# Patient Record
Sex: FEMALE | Race: WHITE | NOT HISPANIC OR LATINO | URBAN - METROPOLITAN AREA
[De-identification: names, ages, dates, MRNs, and addresses within clinical notes are randomized per-mention and may not be internally consistent; named-entity substitution may affect disease eponyms.]

---

## 2018-01-29 ENCOUNTER — EMERGENCY (EMERGENCY)
Facility: HOSPITAL | Age: 68
LOS: 0 days | Discharge: HOME | End: 2018-01-29

## 2018-01-29 DIAGNOSIS — K43.9 VENTRAL HERNIA WITHOUT OBSTRUCTION OR GANGRENE: ICD-10-CM

## 2018-01-29 DIAGNOSIS — I10 ESSENTIAL (PRIMARY) HYPERTENSION: ICD-10-CM

## 2018-01-29 DIAGNOSIS — R10.9 UNSPECIFIED ABDOMINAL PAIN: ICD-10-CM

## 2018-02-12 ENCOUNTER — OUTPATIENT (OUTPATIENT)
Dept: OUTPATIENT SERVICES | Facility: HOSPITAL | Age: 68
LOS: 1 days | Discharge: HOME | End: 2018-02-12

## 2018-02-12 VITALS
WEIGHT: 194.01 LBS | HEART RATE: 80 BPM | HEIGHT: 62 IN | RESPIRATION RATE: 18 BRPM | TEMPERATURE: 97 F | SYSTOLIC BLOOD PRESSURE: 14 MMHG | OXYGEN SATURATION: 97 %

## 2018-02-12 DIAGNOSIS — Z01.818 ENCOUNTER FOR OTHER PREPROCEDURAL EXAMINATION: ICD-10-CM

## 2018-02-12 DIAGNOSIS — Z98.890 OTHER SPECIFIED POSTPROCEDURAL STATES: Chronic | ICD-10-CM

## 2018-02-12 LAB
ANION GAP SERPL CALC-SCNC: 9 MMOL/L — SIGNIFICANT CHANGE UP (ref 7–14)
APPEARANCE UR: (no result)
BACTERIA # UR AUTO: (no result) /HPF
BASOPHILS # BLD AUTO: 0.02 K/UL — SIGNIFICANT CHANGE UP (ref 0–0.2)
BASOPHILS NFR BLD AUTO: 0.3 % — SIGNIFICANT CHANGE UP (ref 0–1)
BILIRUB UR-MCNC: NEGATIVE — SIGNIFICANT CHANGE UP
BUN SERPL-MCNC: 10 MG/DL — SIGNIFICANT CHANGE UP (ref 10–20)
CALCIUM SERPL-MCNC: 9.1 MG/DL — SIGNIFICANT CHANGE UP (ref 8.5–10.1)
CHLORIDE SERPL-SCNC: 101 MMOL/L — SIGNIFICANT CHANGE UP (ref 98–110)
CO2 SERPL-SCNC: 27 MMOL/L — SIGNIFICANT CHANGE UP (ref 17–32)
COLOR SPEC: YELLOW — SIGNIFICANT CHANGE UP
CREAT SERPL-MCNC: 0.6 MG/DL — LOW (ref 0.7–1.5)
DIFF PNL FLD: NEGATIVE — SIGNIFICANT CHANGE UP
EOSINOPHIL # BLD AUTO: 0.29 K/UL — SIGNIFICANT CHANGE UP (ref 0–0.7)
EOSINOPHIL NFR BLD AUTO: 4.3 % — SIGNIFICANT CHANGE UP (ref 0–8)
GLUCOSE SERPL-MCNC: 93 MG/DL — SIGNIFICANT CHANGE UP (ref 70–110)
GLUCOSE UR QL: NEGATIVE MG/DL — SIGNIFICANT CHANGE UP
HCT VFR BLD CALC: 40.7 % — SIGNIFICANT CHANGE UP (ref 37–47)
HGB BLD-MCNC: 13.3 G/DL — LOW (ref 14–18)
IMM GRANULOCYTES NFR BLD AUTO: 0.3 % — SIGNIFICANT CHANGE UP (ref 0.1–0.3)
KETONES UR-MCNC: NEGATIVE — SIGNIFICANT CHANGE UP
LEUKOCYTE ESTERASE UR-ACNC: (no result)
LYMPHOCYTES # BLD AUTO: 2.46 K/UL — SIGNIFICANT CHANGE UP (ref 1.2–3.4)
LYMPHOCYTES # BLD AUTO: 36.6 % — SIGNIFICANT CHANGE UP (ref 20.5–51.1)
MCHC RBC-ENTMCNC: 30 PG — SIGNIFICANT CHANGE UP (ref 27–31)
MCHC RBC-ENTMCNC: 32.7 G/DL — SIGNIFICANT CHANGE UP (ref 32–37)
MCV RBC AUTO: 91.7 FL — HIGH (ref 81–91)
MONOCYTES # BLD AUTO: 0.56 K/UL — SIGNIFICANT CHANGE UP (ref 0.1–0.6)
MONOCYTES NFR BLD AUTO: 8.3 % — SIGNIFICANT CHANGE UP (ref 1.7–9.3)
NEUTROPHILS # BLD AUTO: 3.38 K/UL — SIGNIFICANT CHANGE UP (ref 1.4–6.5)
NEUTROPHILS NFR BLD AUTO: 50.2 % — SIGNIFICANT CHANGE UP (ref 42.2–75.2)
NITRITE UR-MCNC: NEGATIVE — SIGNIFICANT CHANGE UP
NRBC # BLD: 0 /100 WBCS — SIGNIFICANT CHANGE UP (ref 0–0)
PH UR: 7 — SIGNIFICANT CHANGE UP (ref 5–8)
PLATELET # BLD AUTO: 238 K/UL — SIGNIFICANT CHANGE UP (ref 130–400)
POTASSIUM SERPL-MCNC: 4.1 MMOL/L — SIGNIFICANT CHANGE UP (ref 3.5–5)
POTASSIUM SERPL-SCNC: 4.1 MMOL/L — SIGNIFICANT CHANGE UP (ref 3.5–5)
PROT UR-MCNC: NEGATIVE MG/DL — SIGNIFICANT CHANGE UP
RBC # BLD: 4.44 M/UL — SIGNIFICANT CHANGE UP (ref 4.2–5.4)
RBC # FLD: 12.3 % — SIGNIFICANT CHANGE UP (ref 11.5–14.5)
RBC CASTS # UR COMP ASSIST: SIGNIFICANT CHANGE UP /HPF
SODIUM SERPL-SCNC: 137 MMOL/L — SIGNIFICANT CHANGE UP (ref 135–146)
SP GR SPEC: 1.02 — SIGNIFICANT CHANGE UP (ref 1.01–1.03)
UROBILINOGEN FLD QL: 1 MG/DL (ref 0.2–0.2)
WBC # BLD: 6.73 K/UL — SIGNIFICANT CHANGE UP (ref 4.8–10.8)
WBC # FLD AUTO: 6.73 K/UL — SIGNIFICANT CHANGE UP (ref 4.8–10.8)
WBC UR QL: SIGNIFICANT CHANGE UP /HPF

## 2018-02-12 NOTE — H&P PST ADULT - NSANTHOSAYNRD_GEN_A_CORE
No. STACIE screening performed.  STOP BANG Legend: 0-2 = LOW Risk; 3-4 = INTERMEDIATE Risk; 5-8 = HIGH Risk/SEE SCREEN

## 2018-02-12 NOTE — H&P PST ADULT - HISTORY OF PRESENT ILLNESS
PT CURRENTLY DENIES CHEST PAIN, PALPITATIONS, DYSURIA RECENT ILLNESS  EXERCISE TOLERANCE 1 BLOCK  PT DENIES ANY RASHES, ABRASION, OR OPEN WOUNDS OR CUTS    AS PER THE PT AND HER DAUGHTER, THIS IS A COMPLETE MEDICAL AND SURGICAL HX, INCLUDING MEDICATIONS PRESCRIBED AND OVER THE COUNTER MEDICATIONS

## 2018-02-15 ENCOUNTER — OUTPATIENT (OUTPATIENT)
Dept: OUTPATIENT SERVICES | Facility: HOSPITAL | Age: 68
LOS: 1 days | Discharge: HOME | End: 2018-02-15

## 2018-02-15 ENCOUNTER — RESULT REVIEW (OUTPATIENT)
Age: 68
End: 2018-02-15

## 2018-02-15 VITALS — RESPIRATION RATE: 18 BRPM | DIASTOLIC BLOOD PRESSURE: 72 MMHG | HEART RATE: 62 BPM | SYSTOLIC BLOOD PRESSURE: 143 MMHG

## 2018-02-15 VITALS
OXYGEN SATURATION: 96 % | DIASTOLIC BLOOD PRESSURE: 63 MMHG | WEIGHT: 190.04 LBS | TEMPERATURE: 97 F | HEIGHT: 62 IN | RESPIRATION RATE: 18 BRPM | HEART RATE: 64 BPM | SYSTOLIC BLOOD PRESSURE: 122 MMHG

## 2018-02-15 DIAGNOSIS — Z98.890 OTHER SPECIFIED POSTPROCEDURAL STATES: Chronic | ICD-10-CM

## 2018-02-15 RX ORDER — ONDANSETRON 8 MG/1
4 TABLET, FILM COATED ORAL ONCE
Qty: 0 | Refills: 0 | Status: COMPLETED | OUTPATIENT
Start: 2018-02-15 | End: 2018-02-15

## 2018-02-15 RX ORDER — CEPHALEXIN 500 MG
1 CAPSULE ORAL
Qty: 10 | Refills: 0
Start: 2018-02-15

## 2018-02-15 RX ORDER — KETOROLAC TROMETHAMINE 30 MG/ML
30 SYRINGE (ML) INJECTION ONCE
Qty: 0 | Refills: 0 | Status: DISCONTINUED | OUTPATIENT
Start: 2018-02-15 | End: 2018-02-15

## 2018-02-15 RX ORDER — OXYCODONE AND ACETAMINOPHEN 5; 325 MG/1; MG/1
1 TABLET ORAL ONCE
Qty: 0 | Refills: 0 | Status: DISCONTINUED | OUTPATIENT
Start: 2018-02-15 | End: 2018-02-15

## 2018-02-15 RX ORDER — SODIUM CHLORIDE 9 MG/ML
1000 INJECTION, SOLUTION INTRAVENOUS
Qty: 0 | Refills: 0 | Status: DISCONTINUED | OUTPATIENT
Start: 2018-02-15 | End: 2018-03-02

## 2018-02-15 RX ORDER — HYDROMORPHONE HYDROCHLORIDE 2 MG/ML
1 INJECTION INTRAMUSCULAR; INTRAVENOUS; SUBCUTANEOUS ONCE
Qty: 0 | Refills: 0 | Status: DISCONTINUED | OUTPATIENT
Start: 2018-02-15 | End: 2018-02-15

## 2018-02-15 RX ADMIN — ONDANSETRON 4 MILLIGRAM(S): 8 TABLET, FILM COATED ORAL at 12:12

## 2018-02-15 RX ADMIN — SODIUM CHLORIDE 100 MILLILITER(S): 9 INJECTION, SOLUTION INTRAVENOUS at 11:28

## 2018-02-15 NOTE — ASU DISCHARGE PLAN (ADULT/PEDIATRIC). - SPECIAL INSTRUCTIONS
percocet for pain q 4 prn, keflex 2 times daily for 5 day, Keep ambar drain in empty as needed, leave dressing on till office visit

## 2018-02-15 NOTE — ASU DISCHARGE PLAN (ADULT/PEDIATRIC). - NOTIFY
Numbness, tingling/Unable to Urinate/Pain not relieved by Medications/Bleeding that does not stop/Numbness, color, or temperature change to extremity/Swelling that continues/Fever greater than 101/Excessive Diarrhea/Persistent Nausea and Vomiting

## 2018-02-15 NOTE — PRE-ANESTHESIA EVALUATION ADULT - NSANTHOSAYNRD_GEN_A_CORE
SEE SCREEN/No. STACIE screening performed.  STOP BANG Legend: 0-2 = LOW Risk; 3-4 = INTERMEDIATE Risk; 5-8 = HIGH Risk

## 2018-02-15 NOTE — ASU PREOP CHECKLIST - TO WHOM
sunrise down at time of admission- chart done on paper- handoff given to brie garcia by brie mcgowan

## 2018-02-16 LAB — SURGICAL PATHOLOGY STUDY: SIGNIFICANT CHANGE UP

## 2018-02-27 DIAGNOSIS — K43.0 INCISIONAL HERNIA WITH OBSTRUCTION, WITHOUT GANGRENE: ICD-10-CM

## 2019-08-30 ENCOUNTER — EMERGENCY (EMERGENCY)
Facility: HOSPITAL | Age: 69
LOS: 0 days | Discharge: HOME | End: 2019-08-30
Attending: EMERGENCY MEDICINE | Admitting: EMERGENCY MEDICINE
Payer: MEDICARE

## 2019-08-30 VITALS
SYSTOLIC BLOOD PRESSURE: 124 MMHG | OXYGEN SATURATION: 96 % | DIASTOLIC BLOOD PRESSURE: 70 MMHG | TEMPERATURE: 98 F | HEART RATE: 64 BPM | RESPIRATION RATE: 17 BRPM

## 2019-08-30 VITALS
TEMPERATURE: 98 F | WEIGHT: 205.03 LBS | DIASTOLIC BLOOD PRESSURE: 74 MMHG | HEART RATE: 64 BPM | RESPIRATION RATE: 18 BRPM | HEIGHT: 61.02 IN | SYSTOLIC BLOOD PRESSURE: 120 MMHG

## 2019-08-30 DIAGNOSIS — I10 ESSENTIAL (PRIMARY) HYPERTENSION: ICD-10-CM

## 2019-08-30 DIAGNOSIS — M25.471 EFFUSION, RIGHT ANKLE: ICD-10-CM

## 2019-08-30 DIAGNOSIS — I80.01 PHLEBITIS AND THROMBOPHLEBITIS OF SUPERFICIAL VESSELS OF RIGHT LOWER EXTREMITY: ICD-10-CM

## 2019-08-30 DIAGNOSIS — Z98.890 OTHER SPECIFIED POSTPROCEDURAL STATES: Chronic | ICD-10-CM

## 2019-08-30 PROBLEM — F32.9 MAJOR DEPRESSIVE DISORDER, SINGLE EPISODE, UNSPECIFIED: Chronic | Status: ACTIVE | Noted: 2018-02-12

## 2019-08-30 PROBLEM — E66.9 OBESITY, UNSPECIFIED: Chronic | Status: ACTIVE | Noted: 2018-02-12

## 2019-08-30 PROBLEM — F41.9 ANXIETY DISORDER, UNSPECIFIED: Chronic | Status: ACTIVE | Noted: 2018-02-12

## 2019-08-30 PROBLEM — K44.9 DIAPHRAGMATIC HERNIA WITHOUT OBSTRUCTION OR GANGRENE: Chronic | Status: ACTIVE | Noted: 2018-02-12

## 2019-08-30 PROCEDURE — 93970 EXTREMITY STUDY: CPT | Mod: 26

## 2019-08-30 PROCEDURE — 99284 EMERGENCY DEPT VISIT MOD MDM: CPT

## 2019-08-30 RX ORDER — ASPIRIN/CALCIUM CARB/MAGNESIUM 324 MG
0 TABLET ORAL
Qty: 0 | Refills: 0 | DISCHARGE

## 2019-08-30 RX ORDER — LITHIUM CARBONATE 300 MG/1
300 TABLET, EXTENDED RELEASE ORAL
Qty: 0 | Refills: 0 | DISCHARGE

## 2019-08-30 RX ORDER — RISPERIDONE 4 MG/1
0 TABLET ORAL
Qty: 0 | Refills: 0 | DISCHARGE

## 2019-08-30 RX ORDER — SIMVASTATIN 20 MG/1
1 TABLET, FILM COATED ORAL
Qty: 0 | Refills: 0 | DISCHARGE

## 2019-08-30 RX ORDER — LISINOPRIL 2.5 MG/1
1 TABLET ORAL
Qty: 0 | Refills: 0 | DISCHARGE

## 2019-08-30 RX ORDER — ATENOLOL 25 MG/1
1 TABLET ORAL
Qty: 0 | Refills: 0 | DISCHARGE

## 2019-08-30 NOTE — ED PROVIDER NOTE - MUSCULOSKELETAL MINIMAL EXAM
mild tenderness over right calf and inner thigh, Mild bilateral ankle swelling, toes pink , warm with normal cap refill. DP normal

## 2019-08-30 NOTE — ED ADULT TRIAGE NOTE - CHIEF COMPLAINT QUOTE
c/oswelling of ankles x 1 yr. Pt has been seeing a podiatrist and is suppose to have a sono next week

## 2019-08-30 NOTE — ED PROVIDER NOTE - PROVIDER TOKENS
PROVIDER:[TOKEN:[[id number]]] PROVIDER:[TOKEN:[[id number]]],PROVIDER:[TOKEN:[[id number]]]

## 2019-08-30 NOTE — ED PROVIDER NOTE - OBJECTIVE STATEMENT
Patient c/o ankle swelling for years, Seen by PMD and sent to podiatry, Patient noted right leg tenderness over vein past few days. no fever, no chest pain, no SOB.

## 2019-08-30 NOTE — ED PROVIDER NOTE - CARE PROVIDER_API CALL
Artur Lee (MD)  Medicine  [address] [place] [address]  Phone: [phone]  Fax: [phone]  Follow Up Time: Artur Lee)  Medicine  [address] [place] [address]  Phone: [phone]  Fax: [phone]  Follow Up Time:     Dima Guzman)  Vascular Surgery  [address] [place] [address]  Phone: [phone]  Fax: [phone]  Follow Up Time:

## 2019-08-30 NOTE — ED PROVIDER NOTE - INTERPRETATION SERVICES DECLINED
granddaughter acts as interpretor granddaughter acts as /Patient/Caregiver requests family/friend to interpret.

## 2019-08-30 NOTE — ED PROVIDER NOTE - CLINICAL SUMMARY MEDICAL DECISION MAKING FREE TEXT BOX
69 female here for leg pain, had screening imaging identifying superficial thrombophlebitis above the knee. Case briefly d/w Vascular Dr. Lou to align outpatient care, recommendations made, explained to patient and family, will discharge with return and follow up instructions.

## 2019-08-30 NOTE — ED PROVIDER NOTE - NSFOLLOWUPINSTRUCTIONS_ED_ALL_ED_FT
Phlebitis    Phlebitis is soreness and swelling (inflammation) of a vein. This can occur in your arms, legs, or torso (trunk), as well as deeper inside your body. Phlebitis is usually not serious when it occurs close to the surface of the body. However, it can cause serious problems when it occurs in a vein deeper inside the body.    CAUSES  Phlebitis can be triggered by various things, including:     Reduced blood flow through your veins. This can happen with:  Bed rest over a long period.  Long-distance travel.  Injury.   Surgery.   Being overweight (obese) or pregnant.  Having an IV tube put in the vein and getting certain medicines through the vein.  Cancer and cancer treatment.  Use of illegal drugs taken through the vein.  Inflammatory diseases.  Inherited (genetic) diseases that increase the risk of blood clots.  Hormone therapy, such as birth control pills.    SIGNS AND SYMPTOMS  Red, tender, swollen, and painful area on your skin. Usually, the area will be long and narrow.  Firmness along the center of the affected area. This can indicate that a blood clot has formed.   Low-grade fever.     DIAGNOSIS  A health care provider can usually diagnose phlebitis by examining the affected area and asking about your symptoms. To check for infection or blood clots, your health care provider may order blood tests or an ultrasound exam of the area. Blood tests and your family history may also indicate if you have an underlying genetic disease that causes blood clots. Occasionally, a piece of tissue is taken from the body (biopsy sample) if an unusual cause of phlebitis is suspected.    TREATMENT  Treatment will vary depending on the severity of the condition and the area of the body affected. Treatment may include:    Use of a warm compress or heating pad.  Use of compression stockings or bandages.  Anti-inflammatory medicines.   Removal of any IV tube that may be causing the problem.  Medicines that kill germs (antibiotics) if an infection is present.  Blood-thinning medicines if a blood clot is suspected or present.  In rare cases, surgery may be needed to remove damaged sections of vein.     HOME CARE INSTRUCTIONS  Only take over-the-counter or prescription medicines as directed by your health care provider. Take all medicines exactly as prescribed.   Raise (elevate) the affected area above the level of your heart as directed by your health care provider.  Apply a warm compress or heating pad to the affected area as directed by your health care provider. Do not sleep with the heating pad.  Use compression stockings or bandages as directed. These will speed healing and prevent the condition from coming back.   If you are on blood thinners:  Get follow-up blood tests as directed by your health care provider.  Check with your health care provider before using any new medicines.   Carry a medical alert card or wear your medical alert jewelry to show that you are on blood thinners.   For phlebitis in the legs:  Avoid prolonged standing or bed rest.  Keep your legs moving. Raise your legs when sitting or lying.   Do not smoke.  Women, particularly those over the age of 35, should consider the risks and benefits of taking the contraceptive pill. This kind of hormone treatment can increase your risk for blood clots.   Follow up with your health care provider as directed.     SEEK MEDICAL CARE IF:  You have unusual bruising or any bleeding problems.  Your swelling or pain in the affected area is not improving.  You are on anti-inflammatory medicine, and you develop belly (abdominal) pain.     SEEK IMMEDIATE MEDICAL CARE IF:  You have a sudden onset of chest pain or difficulty breathing.   You have a fever or persistent symptoms for more than 2–3 days.   You have a fever and your symptoms suddenly get worse.     MAKE SURE YOU:  Understand these instructions.  Will watch your condition.  Will get help right away if you are not doing well or get worse.    ADDITIONAL NOTES AND INSTRUCTIONS    Please follow up with your Primary MD in 24-48 hr.  Seek immediate medical care for any new/worsening signs or symptoms.     Document Released: 12/12/2002 Document Revised: 4/10/2017 Document Reviewed: 8/25/2014  [employer] Interactive Patient Education ©2016 [employer] This information is not intended to replace advice given to you by your health care provider. Make sure you discuss any questions you have with your health care provider. Take aspirin 325 mg daily,   see vascular surgery 2 weeks.       Phlebitis    Phlebitis is soreness and swelling (inflammation) of a vein. This can occur in your arms, legs, or torso (trunk), as well as deeper inside your body. Phlebitis is usually not serious when it occurs close to the surface of the body. However, it can cause serious problems when it occurs in a vein deeper inside the body.    CAUSES  Phlebitis can be triggered by various things, including:     Reduced blood flow through your veins. This can happen with:  Bed rest over a long period.  Long-distance travel.  Injury.   Surgery.   Being overweight (obese) or pregnant.  Having an IV tube put in the vein and getting certain medicines through the vein.  Cancer and cancer treatment.  Use of illegal drugs taken through the vein.  Inflammatory diseases.  Inherited (genetic) diseases that increase the risk of blood clots.  Hormone therapy, such as birth control pills.    SIGNS AND SYMPTOMS  Red, tender, swollen, and painful area on your skin. Usually, the area will be long and narrow.  Firmness along the center of the affected area. This can indicate that a blood clot has formed.   Low-grade fever.     DIAGNOSIS  A health care provider can usually diagnose phlebitis by examining the affected area and asking about your symptoms. To check for infection or blood clots, your health care provider may order blood tests or an ultrasound exam of the area. Blood tests and your family history may also indicate if you have an underlying genetic disease that causes blood clots. Occasionally, a piece of tissue is taken from the body (biopsy sample) if an unusual cause of phlebitis is suspected.    TREATMENT  Treatment will vary depending on the severity of the condition and the area of the body affected. Treatment may include:    Use of a warm compress or heating pad.  Use of compression stockings or bandages.  Anti-inflammatory medicines.   Removal of any IV tube that may be causing the problem.  Medicines that kill germs (antibiotics) if an infection is present.  Blood-thinning medicines if a blood clot is suspected or present.  In rare cases, surgery may be needed to remove damaged sections of vein.     HOME CARE INSTRUCTIONS  Only take over-the-counter or prescription medicines as directed by your health care provider. Take all medicines exactly as prescribed.   Raise (elevate) the affected area above the level of your heart as directed by your health care provider.  Apply a warm compress or heating pad to the affected area as directed by your health care provider. Do not sleep with the heating pad.  Use compression stockings or bandages as directed. These will speed healing and prevent the condition from coming back.   If you are on blood thinners:  Get follow-up blood tests as directed by your health care provider.  Check with your health care provider before using any new medicines.   Carry a medical alert card or wear your medical alert jewelry to show that you are on blood thinners.   For phlebitis in the legs:  Avoid prolonged standing or bed rest.  Keep your legs moving. Raise your legs when sitting or lying.   Do not smoke.  Women, particularly those over the age of 35, should consider the risks and benefits of taking the contraceptive pill. This kind of hormone treatment can increase your risk for blood clots.   Follow up with your health care provider as directed.     SEEK MEDICAL CARE IF:  You have unusual bruising or any bleeding problems.  Your swelling or pain in the affected area is not improving.  You are on anti-inflammatory medicine, and you develop belly (abdominal) pain.     SEEK IMMEDIATE MEDICAL CARE IF:  You have a sudden onset of chest pain or difficulty breathing.   You have a fever or persistent symptoms for more than 2–3 days.   You have a fever and your symptoms suddenly get worse.     MAKE SURE YOU:  Understand these instructions.  Will watch your condition.  Will get help right away if you are not doing well or get worse.    ADDITIONAL NOTES AND INSTRUCTIONS    Please follow up with your Primary MD in 24-48 hr.  Seek immediate medical care for any new/worsening signs or symptoms.     Document Released: 12/12/2002 Document Revised: 4/10/2017 Document Reviewed: 8/25/2014  [employer] Interactive Patient Education ©2016 [employer] This information is not intended to replace advice given to you by your health care provider. Make sure you discuss any questions you have with your health care provider.

## 2019-08-30 NOTE — ED PROVIDER NOTE - PATIENT PORTAL LINK FT
You can access the FollowMyHealth Patient Portal offered by [facility] by registering at the following website: [email or web address]. By joining [facility]’s FollowMyHealth portal, you will also be able to view your health information using other applications (apps) compatible with our system.

## 2019-08-30 NOTE — ED PROVIDER NOTE - ATTENDING CONTRIBUTION TO CARE
I personally evaluated the patient. I reviewed the Resident’s or Physician Assistant’s note (as assigned above), and agree with the findings and plan except as documented in my note.     69 female here for right leg pain. Advised to come to ED by podiatry for vascular study.     PE: female in no distress. EXT: FROM. no cellulitis no lymphangitis, distally NVI    Impression: superficial thrombophlebitis    Plan: imaging, supportive care, outpatient management

## 2019-09-03 PROBLEM — Z00.00 ENCOUNTER FOR PREVENTIVE HEALTH EXAMINATION: Status: ACTIVE | Noted: 2019-09-03

## 2019-09-05 ENCOUNTER — INBOUND DOCUMENT (OUTPATIENT)
Age: 69
End: 2019-09-05

## 2019-10-01 ENCOUNTER — APPOINTMENT (OUTPATIENT)
Dept: VASCULAR SURGERY | Facility: CLINIC | Age: 69
End: 2019-10-01
Payer: MEDICARE

## 2019-10-01 VITALS
SYSTOLIC BLOOD PRESSURE: 130 MMHG | DIASTOLIC BLOOD PRESSURE: 94 MMHG | WEIGHT: 201 LBS | HEIGHT: 62 IN | BODY MASS INDEX: 36.99 KG/M2

## 2019-10-01 DIAGNOSIS — Z78.9 OTHER SPECIFIED HEALTH STATUS: ICD-10-CM

## 2019-10-01 DIAGNOSIS — F17.200 NICOTINE DEPENDENCE, UNSPECIFIED, UNCOMPLICATED: ICD-10-CM

## 2019-10-01 DIAGNOSIS — M79.89 OTHER SPECIFIED SOFT TISSUE DISORDERS: ICD-10-CM

## 2019-10-01 DIAGNOSIS — Z86.39 PERSONAL HISTORY OF OTHER ENDOCRINE, NUTRITIONAL AND METABOLIC DISEASE: ICD-10-CM

## 2019-10-01 DIAGNOSIS — I10 ESSENTIAL (PRIMARY) HYPERTENSION: ICD-10-CM

## 2019-10-01 DIAGNOSIS — I80.3 PHLEBITIS AND THROMBOPHLEBITIS OF LOWER EXTREMITIES, UNSPECIFIED: ICD-10-CM

## 2019-10-01 DIAGNOSIS — I83.893 VARICOSE VEINS OF BILATERAL LOWER EXTREMITIES WITH OTHER COMPLICATIONS: ICD-10-CM

## 2019-10-01 DIAGNOSIS — F31.9 BIPOLAR DISORDER, UNSPECIFIED: ICD-10-CM

## 2019-10-01 PROCEDURE — 93970 EXTREMITY STUDY: CPT

## 2019-10-01 PROCEDURE — 99203 OFFICE O/P NEW LOW 30 MIN: CPT

## 2019-10-01 RX ORDER — LISINOPRIL 30 MG/1
TABLET ORAL
Refills: 0 | Status: ACTIVE | COMMUNITY

## 2019-10-01 RX ORDER — LITHIUM CARBONATE 300 MG/1
TABLET ORAL
Refills: 0 | Status: ACTIVE | COMMUNITY

## 2019-10-01 RX ORDER — ATENOLOL 50 MG/1
TABLET ORAL
Refills: 0 | Status: ACTIVE | COMMUNITY

## 2019-10-01 RX ORDER — SIMVASTATIN 80 MG/1
TABLET, FILM COATED ORAL
Refills: 0 | Status: ACTIVE | COMMUNITY

## 2019-10-01 RX ORDER — RISPERIDONE 0.5 MG/1
TABLET, FILM COATED ORAL
Refills: 0 | Status: ACTIVE | COMMUNITY

## 2019-10-01 NOTE — DATA REVIEWED
[FreeTextEntry1] : venous duplex shows right greater saphenous vein phlebitis left leg no evidence of DVT or greater saphenous vein incompetency

## 2019-10-01 NOTE — ASSESSMENT
[FreeTextEntry1] : The patient is a 61-year-old female with right leg superficial femoral phlebitis the greater saphenous vein. I recommend warm compresses to the area as well as anti-inflammatories. The patient will benefit from compression stocking therapy a 20-30 mm mercury compression

## 2019-10-01 NOTE — REASON FOR VISIT
[Consultation] : a consultation visit [FreeTextEntry1] : for phlebitis and lower extremity varicose veins

## 2020-06-22 NOTE — HISTORY OF PRESENT ILLNESS
Patient:   ROYCE GUERRERO [id number]            MRN: [id number]            FIN: [id number]              Age:   28 years     Sex:  MALE     :  [birth year]   Associated Diagnoses:   None   Author:   JAZLYN LO Examination  Procedure: Abdominal Ultrasound  Surgeon:   Assistant: Jazlyn Driver MD  Transducer: Cardiac/Abdominal  Findings: No free fluid  Site I Pericardial Sac: Imaged  Comments: No fluid or abnormalities visualized  Site II Cheung Pouch: Imaged  Comments: No fluid or abnormalities visualized  Site III Right Costophrenic Recess: Imaged  Comments: No fluid or abnormalites visualized  Site IV Left Subphrenic Space: Imaged  Comments: No fluid or abnormalites visualized  Site V Left Costophrenic Recess: Imaged  Comments: No fluid or abnormalities visualized  Site VI Pelvis: Imaged  Comments: Equivocal. Unable to obtain adequate images of pelvis due to patient intolerance  The integrity of all instruments and equipment used on the patient during the procedure remained intact after use.  Complications: None  Disposition: Serial exams; Observation  Please see medical record for representative images.  Jazlyn Driver MD  PGY-1 General Surgery Resident      See my note for FAST - Dr. MAE Perez   [FreeTextEntry1] : Patient is a 61-year-old female who recently complained of right leg pain and was sent to the emergency department on August 30. At that time she was diagnosed with right leg superficial thrombophlebitis

## 2020-10-26 NOTE — ASU DISCHARGE PLAN (ADULT/PEDIATRIC). - BATHING
Detail Level: Detailed Was A Bandage Applied: Yes Punch Size In Mm: 4 sponge only Biopsy Type: H and E Anesthesia Type: 1% lidocaine with epinephrine Anesthesia Volume In Cc: 0.5 Additional Anesthesia Volume In Cc (Will Not Render If 0): 0 Hemostasis: None Epidermal Sutures: 4-0 Ethilon Wound Care: Bacitracin Dressing: bandage Suture Removal: 14 days Patient Will Remove Sutures At Home?: No Lab: -1963 Consent: Written consent was obtained and risks were reviewed including but not limited to scarring, infection, bleeding, scabbing, incomplete removal, nerve damage and allergy to anesthesia. Post-Care Instructions: I reviewed with the patient in detail post-care instructions. Patient is to keep the biopsy site dry overnight, and then apply Vaseline twice daily until healed. Patient may apply hydrogen peroxide soaks to remove any crusting. Home Suture Removal Text: Patient was provided a home suture removal kit and will remove their sutures at home.  If they have any questions or difficulties they will call the office. Notification Instructions: Patient will be notified of biopsy results via patient portal or phone call. However, patient instructed to call the office if results are not available within 2 weeks. Results will be faxed to PCP once they are available. Billing Type: Third-Party Bill Information: Selecting Yes will display possible errors in your note based on the variables you have selected. This validation is only offered as a suggestion for you. PLEASE NOTE THAT THE VALIDATION TEXT WILL BE REMOVED WHEN YOU FINALIZE YOUR NOTE. IF YOU WANT TO FAX A PRELIMINARY NOTE YOU WILL NEED TO TOGGLE THIS TO 'NO' IF YOU DO NOT WANT IT IN YOUR FAXED NOTE.
